# Patient Record
Sex: MALE | Race: WHITE | URBAN - METROPOLITAN AREA
[De-identification: names, ages, dates, MRNs, and addresses within clinical notes are randomized per-mention and may not be internally consistent; named-entity substitution may affect disease eponyms.]

---

## 2022-03-16 ENCOUNTER — OFFICE VISIT (OUTPATIENT)
Dept: URBAN - METROPOLITAN AREA CLINIC 85 | Facility: CLINIC | Age: 77
End: 2022-03-16
Payer: MEDICARE

## 2022-03-16 DIAGNOSIS — H25.13 AGE-RELATED NUCLEAR CATARACT, BILATERAL: Primary | ICD-10-CM

## 2022-03-16 DIAGNOSIS — H35.419 LATTICE DEGENERATION OF RETINA: ICD-10-CM

## 2022-03-16 DIAGNOSIS — H52.4 PRESBYOPIA: ICD-10-CM

## 2022-03-16 DIAGNOSIS — H40.013 OPEN ANGLE WITH BORDERLINE FINDINGS, LOW RISK, BILATERAL: ICD-10-CM

## 2022-03-16 PROCEDURE — 92004 COMPRE OPH EXAM NEW PT 1/>: CPT | Performed by: OPHTHALMOLOGY

## 2022-03-16 ASSESSMENT — INTRAOCULAR PRESSURE
OS: 22
OD: 22

## 2022-03-16 ASSESSMENT — KERATOMETRY
OS: 44.63
OD: 44.88

## 2022-03-16 ASSESSMENT — VISUAL ACUITY
OD: 20/25
OS: 20/20

## 2022-03-16 NOTE — IMPRESSION/PLAN
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 Plan: Discussed findings with patient. Monitor.

## 2022-03-16 NOTE — IMPRESSION/PLAN
Impression: Open angle with borderline findings, low risk, bilateral: H40.013. Plan: Discussed findings with patient and need for continued follow up here with periodic testing and asking family members in order to accurately assess risk. Recommend RTC in October for IOP check with possible 24-2 CVF, RNFL OCT and pach.

## 2022-03-16 NOTE — IMPRESSION/PLAN
Impression: Age-related nuclear cataract, bilateral: H25.13. Plan: Discussed findings. Discussed option of CE/IOL OU. Patient understands cataract effect on vision. Patient defers to have  CE w/IOL consult with  Dr. Tono Aguila at this time. Continue to monitor. RTC 1 year CEE or ASAP if decreased VA or pain.